# Patient Record
Sex: FEMALE | Race: OTHER | Employment: OTHER | URBAN - METROPOLITAN AREA
[De-identification: names, ages, dates, MRNs, and addresses within clinical notes are randomized per-mention and may not be internally consistent; named-entity substitution may affect disease eponyms.]

---

## 2018-04-23 ENCOUNTER — HOSPITAL ENCOUNTER (OUTPATIENT)
Dept: LAB | Age: 68
Discharge: HOME | End: 2018-04-23

## 2018-04-23 ENCOUNTER — OFFICE VISIT (OUTPATIENT)
Dept: FAMILY MEDICINE CLINIC | Age: 68
End: 2018-04-23

## 2018-04-23 VITALS
OXYGEN SATURATION: 100 % | BODY MASS INDEX: 28.07 KG/M2 | SYSTOLIC BLOOD PRESSURE: 147 MMHG | WEIGHT: 143 LBS | HEIGHT: 60 IN | DIASTOLIC BLOOD PRESSURE: 89 MMHG | TEMPERATURE: 98.3 F | HEART RATE: 74 BPM

## 2018-04-23 DIAGNOSIS — R06.02 SHORTNESS OF BREATH: Primary | ICD-10-CM

## 2018-04-23 DIAGNOSIS — R06.02 SHORTNESS OF BREATH: ICD-10-CM

## 2018-04-23 LAB
ALBUMIN SERPL-MCNC: 4.3 G/DL (ref 3.5–5)
ALBUMIN/GLOB SERPL: 1.1 {RATIO} (ref 1.1–2.2)
ALP SERPL-CCNC: 80 U/L (ref 45–117)
ALT SERPL-CCNC: 40 U/L (ref 12–78)
ANION GAP SERPL CALC-SCNC: 9 MMOL/L (ref 5–15)
AST SERPL-CCNC: 31 U/L (ref 15–37)
BILIRUB SERPL-MCNC: 0.3 MG/DL (ref 0.2–1)
BUN SERPL-MCNC: 14 MG/DL (ref 6–20)
BUN/CREAT SERPL: 15 (ref 12–20)
CALCIUM SERPL-MCNC: 9.3 MG/DL (ref 8.5–10.1)
CHLORIDE SERPL-SCNC: 103 MMOL/L (ref 97–108)
CO2 SERPL-SCNC: 30 MMOL/L (ref 21–32)
CREAT SERPL-MCNC: 0.92 MG/DL (ref 0.55–1.02)
GLOBULIN SER CALC-MCNC: 3.9 G/DL (ref 2–4)
GLUCOSE SERPL-MCNC: 82 MG/DL (ref 65–100)
POTASSIUM SERPL-SCNC: 4.7 MMOL/L (ref 3.5–5.1)
PROT SERPL-MCNC: 8.2 G/DL (ref 6.4–8.2)
SODIUM SERPL-SCNC: 142 MMOL/L (ref 136–145)

## 2018-04-23 PROCEDURE — 80053 COMPREHEN METABOLIC PANEL: CPT | Performed by: PHYSICIAN ASSISTANT

## 2018-04-23 RX ORDER — MONTELUKAST SODIUM 10 MG/1
10 TABLET ORAL DAILY
Qty: 30 TAB | Refills: 2 | Status: SHIPPED | OUTPATIENT
Start: 2018-04-23

## 2018-04-23 RX ORDER — DEXTROMETHORPHAN HYDROBROMIDE, GUAIFENESIN 5; 100 MG/5ML; MG/5ML
650 LIQUID ORAL EVERY 8 HOURS
Qty: 30 TAB | Refills: 1 | Status: SHIPPED | OUTPATIENT
Start: 2018-04-23

## 2018-04-23 RX ORDER — ALBUTEROL SULFATE 90 UG/1
2 AEROSOL, METERED RESPIRATORY (INHALATION)
Qty: 1 INHALER | Refills: 2 | Status: SHIPPED | OUTPATIENT
Start: 2018-04-23

## 2018-04-23 NOTE — PATIENT INSTRUCTIONS
Falta de aire: Instrucciones de cuidado - [ Shortness of Breath: Care Instructions ]  Instrucciones de cuidado  La falta de aire tiene muchas causas. A veces, las afecciones [address] [place] [address], haylee la ansiedad, pueden ocasionar falta de [place]. Algunas personas tienen kimberly leve falta de aire cuando hacen ejercicio. Las dificultades para respirar también pueden ser síntoma de un problema grave, haylee asma, enfermedad de los pulmones, enfisema, problemas en el corazón y neumonía. Si continúa kim falta de aire, es posible que necesite exámenes y tratamiento. Esté alerta a cualquier cambio en la respiración y otros síntomas. La atención de seguimiento es kimberly parte clave de kim tratamiento y seguridad. Asegúrese de hacer y acudir a todas las citas, y llame a kim médico si está teniendo problemas. También es kimberly buena idea saber los resultados de los exámenes y mantener kimberly lista de los medicamentos que kiera. ¿Cómo puede cuidarse en el hogar? · No fume ni permita que otros fumen cerca de usted. Si necesita ayuda para dejar de fumar, hable con kim médico [employer] y medicamentos para dejar de fumar. Éstos pueden aumentar savannah probabilidades de dejar el hábito para siempre. · Descanse y duerma lo suficiente. · [employer] medicamentos exactamente haylee le fueron recetados. Llame a kim médico si gloria que está teniendo un problema con kim medicamento. · Encuentre maneras saludables de [facility]. ¨ Amelia ejercicio a diario. ¨ Duerma lo suficiente. ¨ Coma con regularidad y rickey. ¿Cuándo debe pedir ayuda? Llame al 911 en cualquier momento que considere que necesita atención de emergencia. Por ejemplo, llame si:  ? · Tiene kimberly grave falta de aire. ? · Tiene síntomas de un ataque al corazón. Estos podrían incluir:  ¨ Dolor o presión en el pecho, o kimberly sensación extraña en el pecho. ¨ Sudoración. ¨ Falta de aire. ¨ Náuseas o vómito.   ¨ Dolor, presión o kimberly sensación extraña en la espalda, el julia, la [place], la parte superior del abdomen, o en nava o ambos hombros o brazos. ¨ Aturdimiento o debilidad repentina. ¨ Latidos cardíacos rápidos o irregulares. Cuando llame al 911, es posible que el operador le diga que mastique 1 aspirina para adultos o 2 a 4 aspirinas de dosis baja. Espere a la ambulancia. No trate de conducir usted mismo un automóvil. ?Llame a kim médico ahora mismo o busque atención médica inmediata si:  ? · La falta de aire empeora o Ramon Climax a tener respiración sibilante (con silbidos). La respiración sibilante es un esther danny que se hace al respirar. ? · Se despierta en la noche sin aire o necesita elevar kim asya sobre varias almohadas para poder respirar. ? · Le falta el aire después de [place] suave o cuando está descansando. ?Preste especial atención a los cambios en kim leora y asegúrese de comunicarse con kim médico si:  ? · No mejora en los siguientes 1 a 2 días. ¿Dónde puede encontrar más información en inglés? Nicholas Villela a [email or web address] Priscilla Pena S780 en la búsqueda para aprender más acerca de \"Falta de aire: Instrucciones de cuidado - [ Shortness of Breath: Care Instructions ]. \"  Revisado: 12 [place], 2017  Versión del contenido: 11.4  © 2949-7241 Healthwise, Incorporated. Las instrucciones de cuidado fueron adaptadas bajo licencia por [employer] (which disclaims liability or warranty for this information). Si usted tiene [place] afección médica o sobre estas instrucciones, siempre pregunte a kim profesional de leora. Healthwise, Incorporated niega toda garantía o responsabilidad por kim uso de esta información.
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
## 2018-04-24 NOTE — PROGRESS NOTES
Edyta Freeman seen at d/c, given AVS and reviewed today's visit with patient with family member present. GoodRx given and explained to patient for the inhaler. Care Card phone # given to patient and process reviewed for the walk in xray. All questions were answered to patient satisfaction.

## 2018-05-04 ENCOUNTER — HOSPITAL ENCOUNTER (OUTPATIENT)
Dept: GENERAL RADIOLOGY | Age: 68
Discharge: HOME | End: 2018-05-04
Payer: SELF-PAY

## 2018-05-04 DIAGNOSIS — R06.02 SHORTNESS OF BREATH: ICD-10-CM

## 2018-05-04 PROCEDURE — 71046 X-RAY EXAM CHEST 2 VIEWS: CPT

## 2018-05-07 NOTE — PROGRESS NOTES
Adolph, her f/up is with you and was scheduled to occur right before she returns to her country. The options are to have her f/up with her doctor in her country or to proceed with additional testing here. Since her f/up is with you (I don't have any openings for 2 months) I want to see if you have a preference since you will be seeing her.  thanks

## 2018-05-11 ENCOUNTER — TELEPHONE (OUTPATIENT)
Dept: FAMILY MEDICINE CLINIC | Age: 68
End: 2018-05-11

## 2018-05-12 NOTE — TELEPHONE ENCOUNTER
I called and spoke to Camanche and told her that at her moms appt on 5/21/18 we would explain the results of the test and give her a copy to take back to her doctor - she confirms that her mom leaves the [address] [place] [address] on 5/29/18.

## 2018-05-21 ENCOUNTER — OFFICE VISIT (OUTPATIENT)
Dept: FAMILY MEDICINE CLINIC | Age: 68
End: 2018-05-21

## 2018-05-21 VITALS
TEMPERATURE: 97.6 F | DIASTOLIC BLOOD PRESSURE: 84 MMHG | SYSTOLIC BLOOD PRESSURE: 133 MMHG | HEART RATE: 76 BPM | WEIGHT: 149 LBS | HEIGHT: 59 IN | BODY MASS INDEX: 30.04 KG/M2 | OXYGEN SATURATION: 100 %

## 2018-05-21 DIAGNOSIS — R05.9 COUGH: ICD-10-CM

## 2018-05-21 DIAGNOSIS — R91.8 LUNG MASS: Primary | ICD-10-CM

## 2018-05-21 NOTE — MR AVS SNAPSHOT
[address] [place] [address] [address] [place] [address] 
[phone] Patient: Isela Alston MRN: [id number]:[id number] Visit Information Beena Singer y Gato Personal Médico Departamento Teléfono del Dep. Número de visita 5/21/2018  1:00 PM Debora Duran NP [facility] [phone] Upcoming Health Maintenance Date Due Hepatitis C Screening 1950 DTaP/Tdap/Td series (1 - Tdap) 12/10/1973 BREAST CANCER SCRN MAMMOGRAM 12/10/2002 FOBT Q 1 YEAR AGE 50-75 12/10/2002 ZOSTER VACCINE AGE 60> 10/10/2012 GLAUCOMA SCREENING Q2Y 12/10/2017 Bone Densitometry (Dexa) Screening 12/10/2017 Pneumococcal 65+ Low/Medium Risk (1 of 2 - PCV13) 12/10/2017 Influenza Age 5 to Adult 8/1/2018 Alergias  Review Complete El: 5/21/2018 Por: LAURO Anderson del:  5/21/2018 No Known Allergies Vacunas actuales Mary Naheed No hay ninguna vacuna archivada. No revisadas esta visita You Were Diagnosed With   
  
 Wilfrido Isak Lung mass    -  Primary ICD-10-CM: R91.8 ICD-9-CM: 129. 6 Cough     ICD-10-CM: R05 ICD-9-CM: 786.2 Partes vitales PS Pulso Temperatura [place] ( percentil de crecimiento) Peso (percentil de crecimiento) SpO2  
 133/84 (BP 1 Location: Left arm, BP Patient Position: Sitting) 76 97.6 °F (36.4 °C) (Oral) 4' 11.25\" (1.505 m) 149 lb (67.6 kg) 100% BMI [facility]) Estado obstétrico Estatus de tabaquísmo 29.84 kg/m2 Menopause Never Smoker Historial de signos vitales BMI and BSA Data Body Mass Index Body Surface Area  
 29.84 kg/m 2 1.68 m 2 [facility] Name Phone [address] [facility], [address] [place] [address] [phone] Trevizo lista de medicamentos actualizada Lista actualizada 5/21/18  3:00 PM.  Hoang Ashley use trevizo lista de medicamentos más reciente. acetaminophen 650 mg Tber También conocido haylee:  TYLENOL ARTHRITIS PAIN Take 1 Tab by mouth every eight (8) hours. por kim dolor, 1 pastilla cada 8 horas  
  
 albuterol 90 mcg/actuation inhaler También conocido haylee:  PROVENTIL HFA, VENTOLIN HFA, PROAIR HFA Take 2 Puffs by inhalation every four (4) hours as needed for Wheezing.  
  
 montelukast 10 mg tablet También conocido haylee:  SINGULAIR Take 1 Tab by mouth daily. por kim tos, 1 pastilla cada cheli Instrucciones para el Paciente Tos: Instrucciones de cuidado - [ Cough: Care Instructions ] Instrucciones de cuidado La tos es kimberly respuesta de kim cuerpo a algo que molesta en la garganta o las vías respiratorias. La tos puede ser provocada por muchas cosas. Usted podría toser debido a un resfriado o kimberly gripe, kimberly bronquitis o el asma. Fumar, el goteo posnasal, las alergias y el ácido estomacal que regresa a kim garganta también pueden causar tos. La tos es un síntoma, no kimberly enfermedad. En la [employer], la tos cesa cuando desaparece la causa, haylee un resfriado. Puede tavon algunas medidas en kim hogar para toser menos y sentirse mejor. La atención de seguimiento es kimberly parte clave de kim tratamiento y seguridad. Asegúrese de hacer y acudir a todas las citas, y llame a kim médico si está teniendo problemas. También es kimberly buena idea saber los resultados de los exámenes y mantener kimberly lista de los medicamentos que kiera. Cómo puede cuidarse en el hogar? · Malu abundante agua y otros líquidos. [place] ayuda a [employer] mucosidad sea menos espesa y [place] la garganta seca o adolorida. La miel o el jugo de golden en [place] o té podrían aliviar kimberly tos seca. · [employer] medicamentos para la tos según las indicaciones de kim médico. 
· Eleve la asya sobre almohadas para ayudarle a respirar y aliviar la tos seca. · Pruebe pastillas para la tos para aliviar la garganta seca o adolorida. Las pastillas para la tos no detienen la tos. Las pastillas para la tos medicinales saborizadas no son mejores que las pastillas con sabor a kelly o los caramelos duros. · No fume. Evite el humo de tabaco ambiental. Si necesita ayuda para dejar de fumar, hable con kim médico sobre programas y medicamentos para dejar de fumar. Estos pueden aumentar savannah probabilidades de dejar el hábito para siempre. Cuándo debe pedir ayuda? Llame al 911 en cualquier momento que considere que necesita atención de [place]. Por ejemplo, llame si: 
? · Tiene graves dificultades para respirar. ? Llame a kim médico ahora mismo o busque atención médica inmediata si: 
? · Tose king. ? · Tiene nuevas dificultades para respirar o Arn Bene. ? · Tiene fiebre nueva o más woody. ? · Tiene un salpullido nuevo. ?Preste especial atención a los cambios en kim leora y asegúrese de comunicarse con kim médico si: 
? · Kim tos es más profunda o más frecuente que antes, especialmente si nota más mucosidad o un cambio en el color de la mucosidad. ? · Tiene nuevos síntomas, haylee dolor de garganta, dolor de oídos o dolor en los senos paranasales. ? · No mejora haylee se esperaba. Dónde puede encontrar más información en inglés? Tana Villarrealans a [email or web address] Escriba D279 en la búsqueda para aprender más acerca de \"Tos: Instrucciones de cuidado - [ Cough: Care Instructions ]. \" 
Revisado: 12 [place], 2017 Versión del contenido: 11.4 © 2303-7412 Healthwise, Incorporated. Las instrucciones de cuidado fueron adaptadas bajo licencia por Good Help Connections (which disclaims liability or warranty for this information). Si usted tiene [place] afección médica o sobre estas instrucciones, siempre pregunte a kim profesional de leora. [employer], Incorporated niega toda garantía o responsabilidad por kim uso de esta información. Introducing [facility]! [facility] introduce portal paciente MyChart . Ahora se puede acceder a partes de kim expediente médico, enviar por correo electrónico la oficina de kim médico y solicitar renovaciones de medicamentos en línea. En kim navegador de Internet , Pedro Elizondo a [email or web address] Atul clic en el usuario por Asa Figueroa? Morris Rumanel clic aquí en la sesión [facility]. Verá la página de registro [place]. Ingrese kim código de [employer] Kim henry y haylee aparece a continuación. Usted no tendrá que [employer] código después de dominique completado el proceso de registro . Si usted no se inscribe antes de la fecha de caducidad , debe solicitar un nuevo código. · MyChart Código de acceso : [id number] Expires: 8/12/2018  5:24 AM 
 
Ingresa los últimos cuatro dígitos de kim Número de Seguro Social ( xxxx ) y fecha de nacimiento ( dd / mm / aaaa ) haylee se indica y atul clic en Enviar. Usted será llevado a la siguiente página de registro . Crear un ID MyChart . Esta será kim ID de inicio de sesión de MyChart y no puede ser [place] , por lo que pensar en kimberly que es [place] y fácil de recordar . Crear kimberly contraseña MyChart . Usted puede cambiar kim contraseña en cualquier momento . Ingrese kim Password Reset de preguntas y [place] . Miller se puede utilizar en un momento posterior si usted olvida kim contraseña. Introduzca kim dirección de correo electrónico . [place] Alm[place]arez recibirá kimberly notificación por correo electrónico cuando la nueva información está disponible en MyChart . Karla Mary Alice clic en Registrarse. Lizzyzach Phillip shala y descargar porciones de kim expediente médico. 
Atul juan en el enlace de descarga del menú Resumen para descargar kimberly copia portátil de kim información médica . Si tiene [employer] , por favor visite la sección de preguntas frecuentes del sitio web MyChart . Recuerde, MyChart NO es que se utilizará para las necesidades urgentes. Para emergencias médicas , llame al 911 . Ahora disponible en kim iPhone y Android ! Por favor proporcione pete resumen de la documentación de cuidado a kim próximo proveedor. Your primary care clinician is listed as NONE. If you have any questions after today's visit, please call [phone].

## 2018-05-21 NOTE — PROGRESS NOTES
Subjective:     Chief Complaint   Patient presents with    Follow-up     cough        She  is a 74 y.o. female who presents for evaluation of SOB. Pt's son is also present and helping to supplement Hx. Since LOV, Pt reports overall her breathing is improved. Pt is returning to [facility] next week. Orig onset approx 1 year, cough worse since Sept.     Cough is always dry. Is not taking singulair. Has been using albuterol 1-2x week, noting it helps w/ her SOB. No assoc N/V/D, constipation         ROS  Gen - no fever/chills  Resp - no dyspnea or cough  CV - no chest pain or FRANCISCO  Rest per HPI    No past medical history on file. No past surgical history on file. Current Outpatient Prescriptions on File Prior to Visit   Medication Sig Dispense Refill    albuterol (PROVENTIL HFA, VENTOLIN HFA, PROAIR HFA) 90 mcg/actuation inhaler Take 2 Puffs by inhalation every four (4) hours as needed for Wheezing. 1 Inhaler 2    montelukast (SINGULAIR) 10 mg tablet Take 1 Tab by mouth daily. por kim tos, 1 pastilla cada cheli 30 Tab 2    acetaminophen (TYLENOL ARTHRITIS PAIN) 650 mg TbER Take 1 Tab by mouth every eight (8) hours. por kim dolor, 1 pastilla cada 8 horas 30 Tab 1     No current facility-administered medications on file prior to visit.          Objective:     Vitals:    05/21/18 1326   BP: 133/84   Pulse: 76   Temp: 97.6 °F (36.4 °C)   TempSrc: Oral   SpO2: 100%   Weight: 149 lb (67.6 kg)   Height: 4' 11.25\" (1.505 m)       Physical Examination:  General appearance - alert, well appearing, and in no distress  Eyes -sclera anicteric  Neck - supple, no significant adenopathy, no thyromegaly  Chest - clear to auscultation, no wheezes, rales or rhonchi, symmetric air entry  Heart - normal rate, regular rhythm, normal S1, S2, no murmurs, rubs, clicks or gallops  Neurological - alert, oriented, no focal findings or movement disorder noted  Abdomen-BS present/WNL x 4 quads, non-tender/distended, soft,no organomegaly    Assessment/ Plan:   Diagnoses and all orders for this visit:    1. Lung mass    2. Cough       Discussed findings of CXR, noting 4cm mass that could not be distinguished between hernia or lung mass. Strongly recommend additional evaluation given diff Dx. Pt and son expressed understanding. Given Pt's pending return to [facility] next week, recommend she obtain a copy of image/report and f/u as able to   To see a pulmonologist.     Cont PRN albuterol. RTC PRN. I have discussed the diagnosis with the patient and the intended plan as seen in the above orders. The patient has received an after-visit summary and questions were answered concerning future plans. I have discussed medication side effects and warnings with the patient as well. The patient verbalizes understanding and agreement with the plan.     Follow-up Disposition: Not on File

## 2018-05-21 NOTE — PROGRESS NOTES
AVS given and reviewed. Per Mare Flanagan, tried to help patient to get CD of lungs x-ray so that she can take her MD/Pulmonologist in [facility]. Called [facility] medical records central office and left message that patient needs a copy of CD. Instructed patient/son to go to [facility] medical records office and request the records in person. Medical records release sheet printed and given to patient/son and instructed to fill out before going in and that patient will have to sign at the bottom. Also gave patient/son information on how to access [facility] so that in case he cannot get the CD he can get the reading impression on the lung x-ray.  Enrrique Marinelli RN

## 2018-05-21 NOTE — PATIENT INSTRUCTIONS
Tos: Instrucciones de cuidado - [ Cough: Care Instructions ]  Instrucciones de cuidado    La tos es [place] de kim cuerpo a algo que molesta en la garganta o las vías respiratorias. La tos puede ser provocada por muchas cosas. Usted podría toser debido a un resfriado o kimberly gripe, kimberly bronquitis o el asma. Fumar, el goteo posnasal, las alergias y el ácido estomacal que regresa a kim garganta también pueden causar tos. La tos es un síntoma, no kimberly enfermedad. En la [employer], la tos cesa cuando desaparece la causa, haylee un resfriado. Puede tavon algunas medidas en kim hogar para toser menos y sentirse mejor. La atención de seguimiento es kimberly parte clave de kim tratamiento y seguridad. Asegúrese de hacer y acudir a todas las citas, y llame a kim médico si está teniendo problemas. También es kimberly buena idea saber los resultados de los exámenes y mantener kimberly lista de los medicamentos que kiera. ¿Cómo puede cuidarse en el hogar? · Malu abundante agua y otros líquidos. [place] ayuda a [employer] mucosidad sea menos espesa y [place] la garganta seca o adolorida. La miel o el jugo de golden en [place] o té podrían aliviar kimberly tos seca. · [employer] medicamentos para la tos según las indicaciones de kim médico.  · Eleve la asya sobre almohadas para ayudarle a respirar y aliviar la tos seca. · Pruebe pastillas para la tos para aliviar la garganta seca o adolorida. Las pastillas para la tos no detienen la tos. Las pastillas para la tos medicinales saborizadas no son mejores que las pastillas con sabor a kelly o los caramelos duros. · No fume. Evite el humo de tabaco ambiental. Si necesita ayuda para dejar de fumar, hable con kim médico sobre programas y medicamentos para dejar de fumar. Estos pueden aumentar [place]annah probabilidades de dejar el hábito para siempre. ¿Cuándo debe pedir ayuda? Llame al 911 en cualquier momento que considere que necesita atención de [place]. Por ejemplo, llame si:  ?  · Tiene graves dificultades para respirar. ? Llame a kim médico ahora mismo o busque atención médica inmediata si:  ? · Tose king. ? · Tiene nuevas dificultades para respirar o [place] Doing. ? · Tiene fiebre nueva o más woody. ? · Tiene un salpullido nuevo. ?Preste especial atención a los cambios en kim leora y asegúrese de comunicarse con kim médico si:  ? · Kim tos es más profunda o más frecuente que antes, especialmente si nota más mucosidad o un cambio en el color de la mucosidad. ? · Tiene nuevos síntomas, haylee dolor de garganta, dolor de oídos o dolor en los senos paranasales. ? · No mejora haylee se esperaba. ¿Dónde puede encontrar más información en inglés? Keagan Jewell a [email or web address] Escriba D279 en la búsqueda para aprender más acerca de \"Tos: Instrucciones de cuidado - [ Cough: Care Instructions ]. \"  Revisado: 12 [place], 2017  Versión del contenido: 11.4  © 5164-1702 Healthwise, Incorporated. Las instrucciones de cuidado fueron adaptadas bajo licencia por Good Help Connections (which disclaims liability or warranty for this information). Si usted tiene [place] afección médica o sobre estas instrucciones, siempre pregunte a kim profesional de leora. Healthwise, Incorporated niega toda garantía o responsabilidad por kim uso de esta información.